# Patient Record
Sex: FEMALE | Race: WHITE | HISPANIC OR LATINO | URBAN - METROPOLITAN AREA
[De-identification: names, ages, dates, MRNs, and addresses within clinical notes are randomized per-mention and may not be internally consistent; named-entity substitution may affect disease eponyms.]

---

## 2017-02-10 ENCOUNTER — FOLLOW UP ESTABLISHED (OUTPATIENT)
Dept: URBAN - METROPOLITAN AREA CLINIC 51 | Facility: CLINIC | Age: 72
End: 2017-02-10
Payer: MEDICARE

## 2017-02-10 PROCEDURE — 92012 INTRM OPH EXAM EST PATIENT: CPT | Performed by: OPTOMETRIST

## 2017-02-10 ASSESSMENT — INTRAOCULAR PRESSURE
OD: 16
OS: 15

## 2017-06-06 ENCOUNTER — FOLLOW UP ESTABLISHED (OUTPATIENT)
Dept: URBAN - METROPOLITAN AREA CLINIC 51 | Facility: CLINIC | Age: 72
End: 2017-06-06
Payer: MEDICARE

## 2017-06-06 PROCEDURE — 92012 INTRM OPH EXAM EST PATIENT: CPT | Performed by: OPTOMETRIST

## 2017-06-06 PROCEDURE — 92083 EXTENDED VISUAL FIELD XM: CPT | Performed by: OPTOMETRIST

## 2017-06-06 RX ORDER — TIMOLOL MALEATE 5 MG/ML
0.5 % SOLUTION/ DROPS OPHTHALMIC
Qty: 3 | Refills: 4 | Status: INACTIVE
Start: 2017-06-06 | End: 2017-08-07

## 2017-06-06 RX ORDER — TIMOLOL MALEATE 5 MG/ML
0.5 % SOLUTION/ DROPS OPHTHALMIC
Qty: 3 | Refills: 4 | Status: INACTIVE
Start: 2017-06-06 | End: 2017-06-06

## 2017-06-06 ASSESSMENT — INTRAOCULAR PRESSURE
OD: 14
OS: 12

## 2017-08-07 ENCOUNTER — FOLLOW UP ESTABLISHED (OUTPATIENT)
Dept: URBAN - METROPOLITAN AREA CLINIC 51 | Facility: CLINIC | Age: 72
End: 2017-08-07
Payer: MEDICARE

## 2017-08-07 DIAGNOSIS — Z96.1 PRESENCE OF INTRAOCULAR LENS: ICD-10-CM

## 2017-08-07 DIAGNOSIS — H40.1131 PRIMARY OPEN-ANGLE GLAUCOMA, BILATERAL, MILD STAGE: ICD-10-CM

## 2017-08-07 PROCEDURE — 92014 COMPRE OPH EXAM EST PT 1/>: CPT | Performed by: OPTOMETRIST

## 2017-08-07 PROCEDURE — 92250 FUNDUS PHOTOGRAPHY W/I&R: CPT | Performed by: OPTOMETRIST

## 2017-08-07 PROCEDURE — 92133 CPTRZD OPH DX IMG PST SGM ON: CPT | Performed by: OPTOMETRIST

## 2017-08-07 RX ORDER — TIMOLOL MALEATE 5 MG/ML
0.5 % SOLUTION/ DROPS OPHTHALMIC
Qty: 3 | Refills: 1 | Status: INACTIVE
Start: 2017-08-07 | End: 2017-12-04

## 2017-08-07 ASSESSMENT — KERATOMETRY
OS: 45.01
OD: 45.77

## 2017-08-07 ASSESSMENT — VISUAL ACUITY
OD: 20/20
OS: 20/25

## 2017-08-07 ASSESSMENT — INTRAOCULAR PRESSURE
OS: 13
OD: 14

## 2017-12-04 ENCOUNTER — FOLLOW UP ESTABLISHED (OUTPATIENT)
Dept: URBAN - METROPOLITAN AREA CLINIC 51 | Facility: CLINIC | Age: 72
End: 2017-12-04
Payer: MEDICARE

## 2017-12-04 PROCEDURE — 92012 INTRM OPH EXAM EST PATIENT: CPT | Performed by: OPTOMETRIST

## 2017-12-04 RX ORDER — TIMOLOL MALEATE 5 MG/ML
0.5 % SOLUTION/ DROPS OPHTHALMIC
Qty: 3 | Refills: 1 | Status: INACTIVE
Start: 2017-12-04 | End: 2018-04-09

## 2017-12-04 ASSESSMENT — INTRAOCULAR PRESSURE
OS: 15
OD: 15

## 2018-04-09 ENCOUNTER — FOLLOW UP ESTABLISHED (OUTPATIENT)
Dept: URBAN - METROPOLITAN AREA CLINIC 51 | Facility: CLINIC | Age: 73
End: 2018-04-09
Payer: MEDICARE

## 2018-04-09 PROCEDURE — 92012 INTRM OPH EXAM EST PATIENT: CPT | Performed by: OPTOMETRIST

## 2018-04-09 RX ORDER — TIMOLOL MALEATE 5 MG/ML
0.5 % SOLUTION/ DROPS OPHTHALMIC
Qty: 3 | Refills: 1 | Status: INACTIVE
Start: 2018-04-09 | End: 2019-03-11

## 2018-04-09 ASSESSMENT — INTRAOCULAR PRESSURE
OD: 17
OS: 16

## 2018-07-10 ENCOUNTER — FOLLOW UP ESTABLISHED (OUTPATIENT)
Dept: URBAN - METROPOLITAN AREA CLINIC 51 | Facility: CLINIC | Age: 73
End: 2018-07-10
Payer: MEDICARE

## 2018-07-10 PROCEDURE — 92012 INTRM OPH EXAM EST PATIENT: CPT | Performed by: OPTOMETRIST

## 2018-07-10 PROCEDURE — 92083 EXTENDED VISUAL FIELD XM: CPT | Performed by: OPTOMETRIST

## 2018-07-10 ASSESSMENT — INTRAOCULAR PRESSURE
OD: 19
OS: 16
OD: 16
OS: 19

## 2018-10-16 ENCOUNTER — FOLLOW UP ESTABLISHED (OUTPATIENT)
Dept: URBAN - METROPOLITAN AREA CLINIC 51 | Facility: CLINIC | Age: 73
End: 2018-10-16
Payer: MEDICARE

## 2018-10-16 DIAGNOSIS — H02.834 DERMATOCHALASIS OF LEFT UPPER LID: ICD-10-CM

## 2018-10-16 PROCEDURE — 92014 COMPRE OPH EXAM EST PT 1/>: CPT | Performed by: OPTOMETRIST

## 2018-10-16 PROCEDURE — 92134 CPTRZ OPH DX IMG PST SGM RTA: CPT | Performed by: OPTOMETRIST

## 2018-10-16 PROCEDURE — 92083 EXTENDED VISUAL FIELD XM: CPT | Performed by: OPTOMETRIST

## 2018-10-16 ASSESSMENT — INTRAOCULAR PRESSURE
OD: 15
OS: 16

## 2018-10-16 ASSESSMENT — VISUAL ACUITY
OD: 20/20
OS: 20/20

## 2018-10-16 ASSESSMENT — KERATOMETRY
OD: 45.88
OS: 44.85

## 2019-03-11 ENCOUNTER — FOLLOW UP ESTABLISHED (OUTPATIENT)
Dept: URBAN - METROPOLITAN AREA CLINIC 51 | Facility: CLINIC | Age: 74
End: 2019-03-11
Payer: MEDICARE

## 2019-03-11 PROCEDURE — 92012 INTRM OPH EXAM EST PATIENT: CPT | Performed by: OPTOMETRIST

## 2019-03-11 RX ORDER — TIMOLOL MALEATE 5 MG/ML
0.5 % SOLUTION/ DROPS OPHTHALMIC
Qty: 3 | Refills: 1 | Status: INACTIVE
Start: 2019-03-11 | End: 2019-10-31

## 2019-03-11 ASSESSMENT — INTRAOCULAR PRESSURE
OD: 17
OS: 17

## 2019-10-31 ENCOUNTER — FOLLOW UP ESTABLISHED (OUTPATIENT)
Dept: URBAN - METROPOLITAN AREA CLINIC 51 | Facility: CLINIC | Age: 74
End: 2019-10-31
Payer: COMMERCIAL

## 2019-10-31 DIAGNOSIS — H43.813 VITREOUS DEGENERATION, BILATERAL: ICD-10-CM

## 2019-10-31 DIAGNOSIS — E11.9 TYPE 2 DIABETES MELLITUS WITHOUT COMPLICATIONS: Primary | ICD-10-CM

## 2019-10-31 DIAGNOSIS — H52.4 PRESBYOPIA: ICD-10-CM

## 2019-10-31 DIAGNOSIS — H02.831 DERMATOCHALASIS OF RIGHT UPPER LID: ICD-10-CM

## 2019-10-31 PROCEDURE — 92014 COMPRE OPH EXAM EST PT 1/>: CPT | Performed by: OPTOMETRIST

## 2019-10-31 PROCEDURE — 92083 EXTENDED VISUAL FIELD XM: CPT | Performed by: OPTOMETRIST

## 2019-10-31 PROCEDURE — 92133 CPTRZD OPH DX IMG PST SGM ON: CPT | Performed by: OPTOMETRIST

## 2019-10-31 RX ORDER — TIMOLOL MALEATE 5 MG/ML
0.5 % SOLUTION/ DROPS OPHTHALMIC
Qty: 3 | Refills: 1 | Status: INACTIVE
Start: 2019-10-31 | End: 2020-02-24

## 2019-10-31 ASSESSMENT — INTRAOCULAR PRESSURE
OS: 16
OD: 19

## 2019-10-31 ASSESSMENT — VISUAL ACUITY
OS: 20/20
OD: 20/20

## 2020-02-24 ENCOUNTER — FOLLOW UP ESTABLISHED (OUTPATIENT)
Dept: URBAN - METROPOLITAN AREA CLINIC 51 | Facility: CLINIC | Age: 75
End: 2020-02-24
Payer: MEDICARE

## 2020-02-24 PROCEDURE — 92012 INTRM OPH EXAM EST PATIENT: CPT | Performed by: OPTOMETRIST

## 2020-02-24 PROCEDURE — 92083 EXTENDED VISUAL FIELD XM: CPT | Performed by: OPTOMETRIST

## 2020-02-24 RX ORDER — TIMOLOL MALEATE 5 MG/ML
0.5 % SOLUTION/ DROPS OPHTHALMIC
Qty: 3 | Refills: 1 | Status: INACTIVE
Start: 2020-02-24 | End: 2020-07-29

## 2020-02-24 ASSESSMENT — INTRAOCULAR PRESSURE
OD: 12
OS: 12
OS: 17
OD: 17

## 2020-07-29 ENCOUNTER — FOLLOW UP ESTABLISHED (OUTPATIENT)
Dept: URBAN - METROPOLITAN AREA CLINIC 51 | Facility: CLINIC | Age: 75
End: 2020-07-29
Payer: COMMERCIAL

## 2020-07-29 PROCEDURE — 92012 INTRM OPH EXAM EST PATIENT: CPT | Performed by: OPTOMETRIST

## 2020-07-29 RX ORDER — TIMOLOL MALEATE 5 MG/ML
0.5 % SOLUTION/ DROPS OPHTHALMIC
Qty: 3 | Refills: 1 | Status: INACTIVE
Start: 2020-07-29 | End: 2021-01-21

## 2020-07-29 ASSESSMENT — INTRAOCULAR PRESSURE
OS: 14
OS: 12
OD: 14

## 2021-05-04 ENCOUNTER — ADULT PHYSICAL (OUTPATIENT)
Dept: URBAN - METROPOLITAN AREA CLINIC 44 | Facility: CLINIC | Age: 76
End: 2021-05-04
Payer: COMMERCIAL

## 2021-05-04 DIAGNOSIS — Z01.818 ENCOUNTER FOR OTHER PREPROCEDURAL EXAMINATION: Primary | ICD-10-CM

## 2021-05-04 DIAGNOSIS — D48.5 NEOPLASM OF UNCERTAIN BEHAVIOR OF SKIN: ICD-10-CM

## 2021-05-04 PROCEDURE — 99203 OFFICE O/P NEW LOW 30 MIN: CPT | Performed by: PHYSICIAN ASSISTANT

## 2021-05-07 ENCOUNTER — SURGERY (OUTPATIENT)
Dept: URBAN - METROPOLITAN AREA SURGERY 19 | Facility: SURGERY | Age: 76
End: 2021-05-07
Payer: COMMERCIAL

## 2021-05-07 PROCEDURE — 15260 FTH/GFT FR N/E/E/L 20 SQCM/<: CPT | Performed by: OPHTHALMOLOGY

## 2021-05-07 RX ORDER — NEOMYCIN SULFATE, POLYMYXIN B SULFATE AND DEXAMETHASONE 3.5; 10000; 1 MG/G; [USP'U]/G; MG/G
OINTMENT OPHTHALMIC
Qty: 5 | Refills: 1 | Status: INACTIVE
Start: 2021-05-07 | End: 2021-05-27

## 2021-05-13 ENCOUNTER — POST-OPERATIVE VISIT (OUTPATIENT)
Dept: URBAN - METROPOLITAN AREA CLINIC 44 | Facility: CLINIC | Age: 76
End: 2021-05-13
Payer: COMMERCIAL

## 2021-05-13 PROCEDURE — 99024 POSTOP FOLLOW-UP VISIT: CPT | Performed by: OPHTHALMOLOGY

## 2021-05-13 NOTE — IMPRESSION/PLAN
Impression: S/P Adjacent Tissue Rearrangement Less Than 10 Square Centimeters; Full Thickness Eyelid Graft - right lower  lid OD - 6 Days. Encounter for other specified surgical aftercare  Z48.89. Plan: Sutures removed today from RUL; graft is alive and healing well. Explained to use MILADY BID for 2 weeks and wear eye shield at bedtime. RTC 2 weeks or sooner PRN.

## 2021-05-27 ENCOUNTER — POST-OPERATIVE VISIT (OUTPATIENT)
Dept: URBAN - METROPOLITAN AREA CLINIC 44 | Facility: CLINIC | Age: 76
End: 2021-05-27
Payer: COMMERCIAL

## 2021-05-27 DIAGNOSIS — Z48.89 ENCOUNTER FOR OTHER SPECIFIED SURGICAL AFTERCARE: Primary | ICD-10-CM

## 2021-05-27 PROCEDURE — 99024 POSTOP FOLLOW-UP VISIT: CPT | Performed by: OPHTHALMOLOGY

## 2021-05-27 RX ORDER — ERYTHROMYCIN 5 MG/G
OINTMENT OPHTHALMIC
Qty: 2 | Refills: 1 | Status: ACTIVE
Start: 2021-05-27

## 2021-07-22 ENCOUNTER — POST-OPERATIVE VISIT (OUTPATIENT)
Dept: URBAN - METROPOLITAN AREA CLINIC 44 | Facility: CLINIC | Age: 76
End: 2021-07-22
Payer: COMMERCIAL

## 2021-07-22 PROCEDURE — 99024 POSTOP FOLLOW-UP VISIT: CPT | Performed by: OPHTHALMOLOGY

## 2021-07-22 NOTE — IMPRESSION/PLAN
Impression: S/P Adjacent Tissue Rearrangement Less Than 10 Square Centimeters; Full Thickness Eyelid Graft - right lower  lid OD - 76 Days. Encounter for other specified surgical aftercare  Z48.89. Plan: well healed. good apposition of puncta to globe. Patient reports some tearing when reading; explained that OD, especially, is dry. Recommend AFT's 3-4x per day OU. RTC PRN.

## 2022-04-05 ENCOUNTER — OFFICE VISIT (OUTPATIENT)
Dept: URBAN - METROPOLITAN AREA CLINIC 51 | Facility: CLINIC | Age: 77
End: 2022-04-05
Payer: COMMERCIAL

## 2022-04-05 DIAGNOSIS — Z79.84 LONG TERM (CURRENT) USE OF ORAL HYPOGLYCEMIC DRUGS: ICD-10-CM

## 2022-04-05 DIAGNOSIS — H04.123 TEAR FILM INSUFFICIENCY OF BILATERAL LACRIMAL GLANDS: ICD-10-CM

## 2022-04-05 PROCEDURE — 92014 COMPRE OPH EXAM EST PT 1/>: CPT | Performed by: OPTOMETRIST

## 2022-04-05 PROCEDURE — 92133 CPTRZD OPH DX IMG PST SGM ON: CPT | Performed by: OPTOMETRIST

## 2022-04-05 PROCEDURE — 92083 EXTENDED VISUAL FIELD XM: CPT | Performed by: OPTOMETRIST

## 2022-04-05 PROCEDURE — 92134 CPTRZ OPH DX IMG PST SGM RTA: CPT | Performed by: OPTOMETRIST

## 2022-04-05 ASSESSMENT — VISUAL ACUITY
OD: 20/25
OS: 20/25

## 2022-04-05 ASSESSMENT — KERATOMETRY
OD: 45.67
OS: 44.83

## 2022-04-05 ASSESSMENT — INTRAOCULAR PRESSURE
OD: 15
OS: 15

## 2022-04-05 NOTE — IMPRESSION/PLAN
Impression: Presence of intraocular lens ; OU Plan: Well centered IOLs, status quo OU
[address] OU, not affecting vision at this time.

## 2022-04-05 NOTE — IMPRESSION/PLAN
Impression: Primary open-angle glaucoma, mild stage, bilateral OS>>OD *s/p istent OU
*IOPs today 15mmHg OU with Timolol BID OU, reports good adherence and no adverse reaction *Denies lung/heart problems - Denies any drug allergies *Secondary to disc asymmetry OD>OS, vertical elongation OU  
*Thin PACHS -495/502 *Denies FOHx *OCT RNFL (04/05/2022) OD: 75um ; no thinning OS: 70um ; abnormal inf thinning *OCT RNFL (10/31/19) OD: 76um; nasal thinning OS: 75um; inferior thinning *OCT RNFL (08/07/17) OD: normal NFL
OS: inferior thinning *HVF 24-2 (04/05/2022) OD: excessive high false positives; high fixation loss OS: excessive high false positives *HVF 24-2 (2 /24/2020): OD: full OS: superior nasal step with early central defects *HVF 24-2 (10/31/19) OD: excessive high false positives ; unreliable due to fixation loss  2/11; superior arcuate depression OS: unreliable due to fixation 4/10; superior nasal step Plan: The glaucoma seems to be managed well with current gtt regimen. I have reviewed with the patient to continue with current regimen. Refill of medications asked and a electronic Rx was sent to the patient's pharmacy of choice.  RTC in 4 months for IOP check with Dr. Daryle Ban

## 2022-08-04 ENCOUNTER — OFFICE VISIT (OUTPATIENT)
Dept: URBAN - METROPOLITAN AREA CLINIC 51 | Facility: CLINIC | Age: 77
End: 2022-08-04
Payer: COMMERCIAL

## 2022-08-04 DIAGNOSIS — H04.123 TEAR FILM INSUFFICIENCY OF BILATERAL LACRIMAL GLANDS: ICD-10-CM

## 2022-08-04 DIAGNOSIS — H40.1131 PRIMARY OPEN-ANGLE GLAUCOMA, BILATERAL, MILD STAGE: Primary | ICD-10-CM

## 2022-08-04 PROCEDURE — 92083 EXTENDED VISUAL FIELD XM: CPT | Performed by: OPTOMETRIST

## 2022-08-04 PROCEDURE — 99214 OFFICE O/P EST MOD 30 MIN: CPT | Performed by: OPTOMETRIST

## 2022-08-04 RX ORDER — TIMOLOL MALEATE 5 MG/ML
0.5 % SOLUTION/ DROPS OPHTHALMIC
Qty: 10 | Refills: 3 | Status: INACTIVE
Start: 2022-08-04 | End: 2022-08-04

## 2022-08-04 RX ORDER — TIMOLOL MALEATE 5 MG/ML
0.5 % SOLUTION/ DROPS OPHTHALMIC
Qty: 10 | Refills: 3 | Status: ACTIVE
Start: 2022-08-04

## 2022-08-04 ASSESSMENT — INTRAOCULAR PRESSURE
OD: 16
OS: 16

## 2022-08-04 NOTE — IMPRESSION/PLAN
Impression: Primary open-angle glaucoma, mild stage, bilateral OS>>OD *s/p istent OU
*IOPs today 16mmHg OU with Timolol BID OU, reports good adherence and no adverse reaction *Denies lung/heart problems - Denies any drug allergies *Secondary to disc asymmetry OD>OS, vertical elongation OU  
*Thin PACHS -495/502 *Denies FOHx *OCT RNFL (04/05/2022) OD: 75um ; no thinning OS: 70um ; abnormal inf thinning *OCT RNFL (10/31/19) OD: 76um; nasal thinning OS: 75um; inferior thinning *OCT RNFL (08/07/17) OD: normal NFL
OS: inferior thinning *HVF 24-2 (08/04/2022) OD: fixation loss OS: superior nasal step *HVF 24-2 (04/05/2022) OD: excessive high false positives; high fixation loss OS: excessive high false positives *HVF 24-2 (2 /24/2020): OD: full OS: superior nasal step with early central defects *HVF 24-2 (10/31/19) OD: excessive high false positives ; unreliable due to fixation loss  2/11; superior arcuate depression OS: unreliable due to fixation 4/10; superior nasal step Plan: The glaucoma seems to be managed well with current gtt regimen. I have reviewed with the patient to continue with current regimen. Refill of medications asked and a electronic Rx was sent to the patient's pharmacy of choice.  RTC in 4 months for IOP check with Dr. Rk Golden

## 2022-08-04 NOTE — IMPRESSION/PLAN
Impression: Tear film insufficiency of bilateral lacrimal glands: H04.123. Plan: To help with watery eyes, recommend to use artificial tears QID OU. Pt understands.

## 2022-12-05 ENCOUNTER — OFFICE VISIT (OUTPATIENT)
Dept: URBAN - METROPOLITAN AREA CLINIC 56 | Facility: LOCATION | Age: 77
End: 2022-12-05
Payer: COMMERCIAL

## 2022-12-05 DIAGNOSIS — H40.1131 PRIMARY OPEN-ANGLE GLAUCOMA, BILATERAL, MILD STAGE: Primary | ICD-10-CM

## 2022-12-05 PROCEDURE — 99213 OFFICE O/P EST LOW 20 MIN: CPT | Performed by: OPTOMETRIST

## 2022-12-05 ASSESSMENT — INTRAOCULAR PRESSURE
OS: 16
OD: 16

## 2022-12-05 NOTE — IMPRESSION/PLAN
Impression: Primary open-angle glaucoma, mild stage, bilateral OS>>OD *s/p istent OU
*IOPs today 16mmHg OU with Timolol BID OU, reports good adherence and no adverse reaction *Denies lung/heart problems - Denies any drug allergies *Secondary to disc asymmetry OD>OS, vertical elongation OU  
*Thin PACHS -495/502 *Denies FOHx *OCT RNFL (04/05/2022) OD: 75um ; no thinning OS: 70um ; abnormal inf thinning *OCT RNFL (10/31/19) OD: 76um; nasal thinning OS: 75um; inferior thinning *OCT RNFL (08/07/17) OD: normal NFL
OS: inferior thinning *HVF 24-2 (08/04/2022) OD: fixation loss OS: superior nasal step *HVF 24-2 (04/05/2022) OD: excessive high false positives; high fixation loss OS: excessive high false positives *HVF 24-2 (2 /24/2020): OD: full OS: superior nasal step with early central defects *HVF 24-2 (10/31/19) OD: excessive high false positives ; unreliable due to fixation loss  2/11; superior arcuate depression OS: unreliable due to fixation 4/10; superior nasal step Plan: The glaucoma seems to be managed well with current gtt regimen. I have reviewed with the patient to continue with current regimen. Refill of medications asked and a electronic Rx was sent to the patient's pharmacy of choice. RTC in April for CE, OCT RNFL/MAC and Fundus photos.  Last HVF was 08/04/2022

## 2023-05-16 ENCOUNTER — OFFICE VISIT (OUTPATIENT)
Dept: URBAN - METROPOLITAN AREA CLINIC 51 | Facility: CLINIC | Age: 78
End: 2023-05-16
Payer: COMMERCIAL

## 2023-05-16 DIAGNOSIS — Z79.84 LONG TERM (CURRENT) USE OF ORAL HYPOGLYCEMIC DRUGS: ICD-10-CM

## 2023-05-16 DIAGNOSIS — H43.813 VITREOUS DEGENERATION, BILATERAL: ICD-10-CM

## 2023-05-16 DIAGNOSIS — H02.834 DERMATOCHALASIS OF LEFT UPPER EYELID: ICD-10-CM

## 2023-05-16 DIAGNOSIS — E11.9 TYPE 2 DIABETES MELLITUS WITHOUT COMPLICATIONS: Primary | ICD-10-CM

## 2023-05-16 DIAGNOSIS — H52.4 PRESBYOPIA: ICD-10-CM

## 2023-05-16 DIAGNOSIS — H40.1131 PRIMARY OPEN-ANGLE GLAUCOMA, BILATERAL, MILD STAGE: ICD-10-CM

## 2023-05-16 DIAGNOSIS — H02.831 DERMATOCHALASIS OF RIGHT UPPER EYELID: ICD-10-CM

## 2023-05-16 DIAGNOSIS — H04.123 TEAR FILM INSUFFICIENCY OF BILATERAL LACRIMAL GLANDS: ICD-10-CM

## 2023-05-16 DIAGNOSIS — Z96.1 PRESENCE OF INTRAOCULAR LENS: ICD-10-CM

## 2023-05-16 PROCEDURE — 92014 COMPRE OPH EXAM EST PT 1/>: CPT | Performed by: OPTOMETRIST

## 2023-05-16 PROCEDURE — 92134 CPTRZ OPH DX IMG PST SGM RTA: CPT | Performed by: OPTOMETRIST

## 2023-05-16 PROCEDURE — 92133 CPTRZD OPH DX IMG PST SGM ON: CPT | Performed by: OPTOMETRIST

## 2023-05-16 PROCEDURE — 92250 FUNDUS PHOTOGRAPHY W/I&R: CPT | Performed by: OPTOMETRIST

## 2023-05-16 RX ORDER — TIMOLOL MALEATE 5 MG/ML
0.5 % SOLUTION/ DROPS OPHTHALMIC
Qty: 10 | Refills: 3 | Status: ACTIVE
Start: 2023-05-16

## 2023-05-16 ASSESSMENT — INTRAOCULAR PRESSURE
OS: 14
OD: 11

## 2023-05-16 ASSESSMENT — KERATOMETRY
OS: 44.50
OD: 45.88

## 2023-05-16 ASSESSMENT — VISUAL ACUITY
OD: 20/25
OS: 20/40

## 2023-05-16 NOTE — IMPRESSION/PLAN
Impression: Type 2 diabetes mellitus without complications: O53.5. Plan: No Background Diabetic Retinopathy, no Diabetic Macular Edema and no Neovascularization of the iris, disc, or elsewhere. Disc. ocular and systemic benefits of blood sugar control. The [employer] Diabetes Association recommends target glycohemoglobin at 7.0% or less to minimize incidence of retinopathy as well as other systemic complications of diabetes mellitus. Send notes to PCP. Check annually.

## 2023-05-16 NOTE — IMPRESSION/PLAN
Impression: Primary open-angle glaucoma, mild stage, bilateral OS>>OD *s/p istent OU
*IOPs today 11mmHg OD and 14mmHg OS with Timolol BID OU, reports good adherence and no adverse reaction *Denies lung/heart problems - Denies any drug allergies *Secondary to disc asymmetry OD>OS, vertical elongation OU  
*Thin PACHS -495/502 *Denies FOHx *OCT RNFL (05/16/23): OD: 70um ; borderline nasal/inf thinning OS: *OCT RNFL (04/05/2022) OD: 75um ; no thinning OS: 70um ; abnormal inf thinning *OCT RNFL (10/31/19) OD: 76um; nasal thinning OS: 75um; inferior thinning *OCT RNFL (08/07/17) OD: normal NFL
OS: inferior thinning *HVF 24-2 (08/04/2022) OD: fixation loss OS: superior nasal step *HVF 24-2 (04/05/2022) OD: excessive high false positives; high fixation loss OS: excessive high false positives *OCT RNFL (05/16/2023) OD: 70um ; borderline nasal/inf thinning OS: 73um ; abnormal inf thinning Plan: The glaucoma seems to be managed well with current gtt regimen. I have reviewed with the patient to continue with current regimen. Refill of medications asked and a electronic Rx was sent to the patient's pharmacy of choice. I have reviewed the importance of treatment compliance and this is to slow down the progression and to not cure the disease. Reviewed that it is a symptomless disease Pt to RTC in 4 months for an IOP check and HVF

## 2023-10-02 ENCOUNTER — OFFICE VISIT (OUTPATIENT)
Dept: URBAN - METROPOLITAN AREA CLINIC 51 | Facility: CLINIC | Age: 78
End: 2023-10-02
Payer: COMMERCIAL

## 2023-10-02 DIAGNOSIS — H40.1131 PRIMARY OPEN-ANGLE GLAUCOMA, BILATERAL, MILD STAGE: Primary | ICD-10-CM

## 2023-10-02 PROCEDURE — 99213 OFFICE O/P EST LOW 20 MIN: CPT | Performed by: OPTOMETRIST

## 2023-10-02 PROCEDURE — 92083 EXTENDED VISUAL FIELD XM: CPT | Performed by: OPTOMETRIST

## 2023-10-02 RX ORDER — TIMOLOL MALEATE 5 MG/ML
0.5 % SOLUTION/ DROPS OPHTHALMIC
Qty: 10 | Refills: 3 | Status: ACTIVE
Start: 2023-10-02

## 2023-10-02 ASSESSMENT — INTRAOCULAR PRESSURE
OS: 13
OD: 14

## 2024-02-05 ENCOUNTER — OFFICE VISIT (OUTPATIENT)
Dept: URBAN - METROPOLITAN AREA CLINIC 51 | Facility: CLINIC | Age: 79
End: 2024-02-05
Payer: COMMERCIAL

## 2024-02-05 DIAGNOSIS — H40.1131 PRIMARY OPEN-ANGLE GLAUCOMA, BILATERAL, MILD STAGE: Primary | ICD-10-CM

## 2024-02-05 PROCEDURE — 92083 EXTENDED VISUAL FIELD XM: CPT | Performed by: OPTOMETRIST

## 2024-02-05 PROCEDURE — 99213 OFFICE O/P EST LOW 20 MIN: CPT | Performed by: OPTOMETRIST

## 2024-02-05 RX ORDER — TIMOLOL MALEATE 5 MG/ML
0.5 % SOLUTION/ DROPS OPHTHALMIC
Qty: 10 | Refills: 3 | Status: ACTIVE
Start: 2024-02-05

## 2024-02-05 ASSESSMENT — INTRAOCULAR PRESSURE
OS: 14
OD: 16

## 2024-05-20 ENCOUNTER — OFFICE VISIT (OUTPATIENT)
Dept: URBAN - METROPOLITAN AREA CLINIC 51 | Facility: CLINIC | Age: 79
End: 2024-05-20
Payer: COMMERCIAL

## 2024-05-20 DIAGNOSIS — Z96.1 PRESENCE OF INTRAOCULAR LENS: ICD-10-CM

## 2024-05-20 DIAGNOSIS — H40.1131 PRIMARY OPEN-ANGLE GLAUCOMA, BILATERAL, MILD STAGE: ICD-10-CM

## 2024-05-20 DIAGNOSIS — H43.813 VITREOUS DEGENERATION, BILATERAL: ICD-10-CM

## 2024-05-20 DIAGNOSIS — H02.831 DERMATOCHALASIS OF RIGHT UPPER EYELID: ICD-10-CM

## 2024-05-20 DIAGNOSIS — H02.834 DERMATOCHALASIS OF LEFT UPPER EYELID: ICD-10-CM

## 2024-05-20 DIAGNOSIS — E11.9 TYPE 2 DIABETES MELLITUS WITHOUT COMPLICATIONS: Primary | ICD-10-CM

## 2024-05-20 DIAGNOSIS — Z79.84 LONG TERM (CURRENT) USE OF ORAL HYPOGLYCEMIC DRUGS: ICD-10-CM

## 2024-05-20 DIAGNOSIS — H52.4 PRESBYOPIA: ICD-10-CM

## 2024-05-20 DIAGNOSIS — H04.123 TEAR FILM INSUFFICIENCY OF BILATERAL LACRIMAL GLANDS: ICD-10-CM

## 2024-05-20 PROCEDURE — 92133 CPTRZD OPH DX IMG PST SGM ON: CPT | Performed by: OPTOMETRIST

## 2024-05-20 PROCEDURE — 92134 CPTRZ OPH DX IMG PST SGM RTA: CPT | Performed by: OPTOMETRIST

## 2024-05-20 PROCEDURE — 92014 COMPRE OPH EXAM EST PT 1/>: CPT | Performed by: OPTOMETRIST

## 2024-05-20 RX ORDER — TIMOLOL MALEATE 5 MG/ML
0.5 % SOLUTION/ DROPS OPHTHALMIC
Qty: 10 | Refills: 3 | Status: ACTIVE
Start: 2024-05-20

## 2024-05-20 ASSESSMENT — INTRAOCULAR PRESSURE
OS: 15
OD: 15

## 2024-05-20 ASSESSMENT — KERATOMETRY
OD: 46.00
OS: 44.88

## 2024-05-20 ASSESSMENT — VISUAL ACUITY
OS: 20/40
OD: 20/30

## 2024-08-27 ENCOUNTER — OFFICE VISIT (OUTPATIENT)
Dept: URBAN - METROPOLITAN AREA CLINIC 51 | Facility: CLINIC | Age: 79
End: 2024-08-27
Payer: COMMERCIAL

## 2024-08-27 DIAGNOSIS — H40.1131 PRIMARY OPEN-ANGLE GLAUCOMA, BILATERAL, MILD STAGE: Primary | ICD-10-CM

## 2024-08-27 PROCEDURE — 99212 OFFICE O/P EST SF 10 MIN: CPT | Performed by: OPTOMETRIST

## 2024-08-27 RX ORDER — TIMOLOL MALEATE 5 MG/ML
0.5 % SOLUTION/ DROPS OPHTHALMIC
Qty: 10 | Refills: 3 | Status: ACTIVE
Start: 2024-08-27

## 2024-08-27 RX ORDER — TIMOLOL MALEATE 5 MG/ML
0.5 % SOLUTION/ DROPS OPHTHALMIC
Qty: 10 | Refills: 3 | Status: INACTIVE
Start: 2024-08-27 | End: 2024-08-27

## 2024-08-27 ASSESSMENT — VISUAL ACUITY
OD: 20/30
OS: 20/25

## 2024-08-27 ASSESSMENT — KERATOMETRY
OS: 44.75
OD: 46.25

## 2024-08-27 ASSESSMENT — INTRAOCULAR PRESSURE
OD: 15
OS: 15

## 2025-02-25 ENCOUNTER — OFFICE VISIT (OUTPATIENT)
Dept: URBAN - METROPOLITAN AREA CLINIC 51 | Facility: CLINIC | Age: 80
End: 2025-02-25
Payer: COMMERCIAL

## 2025-02-25 DIAGNOSIS — H40.1131 PRIMARY OPEN-ANGLE GLAUCOMA, BILATERAL, MILD STAGE: Primary | ICD-10-CM

## 2025-02-25 PROCEDURE — 92014 COMPRE OPH EXAM EST PT 1/>: CPT | Performed by: OPTOMETRIST

## 2025-02-25 RX ORDER — TIMOLOL MALEATE 5 MG/ML
0.5 % SOLUTION/ DROPS OPHTHALMIC
Qty: 10 | Refills: 3 | Status: INACTIVE
Start: 2025-02-25 | End: 2025-02-25

## 2025-02-25 RX ORDER — TIMOLOL MALEATE 5 MG/ML
0.5 % SOLUTION/ DROPS OPHTHALMIC
Qty: 10 | Refills: 3 | Status: ACTIVE
Start: 2025-02-25

## 2025-02-25 ASSESSMENT — INTRAOCULAR PRESSURE
OS: 16
OD: 16

## 2025-06-26 ENCOUNTER — OFFICE VISIT (OUTPATIENT)
Dept: URBAN - METROPOLITAN AREA CLINIC 51 | Facility: CLINIC | Age: 80
End: 2025-06-26
Payer: COMMERCIAL

## 2025-06-26 DIAGNOSIS — H40.1131 PRIMARY OPEN-ANGLE GLAUCOMA, BILATERAL, MILD STAGE: ICD-10-CM

## 2025-06-26 DIAGNOSIS — H52.4 PRESBYOPIA: ICD-10-CM

## 2025-06-26 DIAGNOSIS — H43.813 VITREOUS DEGENERATION, BILATERAL: ICD-10-CM

## 2025-06-26 DIAGNOSIS — H04.123 TEAR FILM INSUFFICIENCY OF BILATERAL LACRIMAL GLANDS: ICD-10-CM

## 2025-06-26 DIAGNOSIS — Z79.84 LONG TERM (CURRENT) USE OF ORAL HYPOGLYCEMIC DRUGS: ICD-10-CM

## 2025-06-26 DIAGNOSIS — Z96.1 PRESENCE OF INTRAOCULAR LENS: ICD-10-CM

## 2025-06-26 DIAGNOSIS — E11.9 TYPE 2 DIABETES MELLITUS WITHOUT COMPLICATIONS: Primary | ICD-10-CM

## 2025-06-26 PROCEDURE — 92133 CPTRZD OPH DX IMG PST SGM ON: CPT | Performed by: OPTOMETRIST

## 2025-06-26 PROCEDURE — 92134 CPTRZ OPH DX IMG PST SGM RTA: CPT | Performed by: OPTOMETRIST

## 2025-06-26 PROCEDURE — 92014 COMPRE OPH EXAM EST PT 1/>: CPT | Performed by: OPTOMETRIST

## 2025-06-26 RX ORDER — TIMOLOL MALEATE 5 MG/ML
0.5 % SOLUTION/ DROPS OPHTHALMIC
Qty: 10 | Refills: 3 | Status: ACTIVE
Start: 2025-06-26

## 2025-06-26 ASSESSMENT — INTRAOCULAR PRESSURE
OS: 16
OD: 15

## 2025-06-26 ASSESSMENT — VISUAL ACUITY
OD: 20/25
OS: 20/25

## 2025-06-26 ASSESSMENT — KERATOMETRY
OS: 44.63
OD: 45.88